# Patient Record
Sex: MALE | Employment: STUDENT | URBAN - METROPOLITAN AREA
[De-identification: names, ages, dates, MRNs, and addresses within clinical notes are randomized per-mention and may not be internally consistent; named-entity substitution may affect disease eponyms.]

---

## 2017-01-04 ENCOUNTER — OFFICE VISIT (OUTPATIENT)
Dept: PULMONOLOGY | Facility: CLINIC | Age: 27
End: 2017-01-04
Attending: ALLERGY & IMMUNOLOGY
Payer: COMMERCIAL

## 2017-01-04 VITALS
DIASTOLIC BLOOD PRESSURE: 63 MMHG | HEART RATE: 80 BPM | SYSTOLIC BLOOD PRESSURE: 109 MMHG | OXYGEN SATURATION: 97 % | RESPIRATION RATE: 16 BRPM

## 2017-01-04 DIAGNOSIS — R05.3 CHRONIC COUGH: ICD-10-CM

## 2017-01-04 DIAGNOSIS — J45.909 UNCOMPLICATED ASTHMA, UNSPECIFIED ASTHMA SEVERITY: ICD-10-CM

## 2017-01-04 DIAGNOSIS — J30.1 SEASONAL ALLERGIC RHINITIS DUE TO POLLEN: Primary | ICD-10-CM

## 2017-01-04 DIAGNOSIS — J30.89 ALLERGIC RHINITIS DUE TO AMERICAN HOUSE DUST MITE: ICD-10-CM

## 2017-01-04 DIAGNOSIS — R06.00 DYSPNEA, UNSPECIFIED TYPE: ICD-10-CM

## 2017-01-04 DIAGNOSIS — J31.0 CHRONIC RHINITIS: ICD-10-CM

## 2017-01-04 PROCEDURE — 99214 OFFICE O/P EST MOD 30 MIN: CPT | Mod: 25,ZF

## 2017-01-04 PROCEDURE — 95018 ALL TSTG PERQ&IQ DRUGS/BIOL: CPT | Mod: ZF

## 2017-01-04 PROCEDURE — 95004 PERQ TESTS W/ALRGNC XTRCS: CPT | Performed by: ALLERGY & IMMUNOLOGY

## 2017-01-04 ASSESSMENT — PAIN SCALES - GENERAL: PAINLEVEL: NO PAIN (0)

## 2017-01-04 NOTE — MR AVS SNAPSHOT
After Visit Summary   1/4/2017    Andrea Agarwal    MRN: [id number]           Patient Information     Date Of Birth          1990        Visit Information        Provider Department      1/4/2017 10:15 AM Jean Huerta MD [facility]        Today's Diagnoses     Seasonal allergic rhinitis due to pollen    -  1     Allergic rhinitis due to American house dust mite         Dyspnea, unspecified type           Care Instructions        Qvar with a baby bottle nipple adapter 1 spray in each nostril daily.  At the onset of colds remove nipple adapter and inhale 1 puff twice a day for up to 7 days (rinse mouth after use)    DUST MITE ALLERGY  Dust mites are microscopic bugs that live in dust, feeding on dead skin from our bodies. Dust mites flourish in warm, humid environments and therefore are highest  in number in carpeting, pillows and mattresses.     Tips:    Encase pillows, mattresses, and box springs in zippered allergy proof covers.    Wash all bed linens in at least 1300 F every week.    Remove stuffed animals or freeze them every other week.     Remove upholstered furniture from the bedroom and consider removing the carpet.     Keep ceiling fans off in the bedroom as they can stir up dust mite allergens.    Frequently dust and vacuum the house, especially the bedroom.    Acaracides (chemicals which kill mites) are available for use in the home. These acaracides require repeated applications to remain effective and may irritate asthmatics. It is still unclear how much, if any clinical benefit is gained by the use of acaracides. Therefore these agents are usually not recommended for initial mite control.        *All information has been reviewed, updated and approved by:  Dr. Jean Huerta-[place] [facility] at [facility] updated: 11/2016         Pollen Control Measures      * Keep windows and doors shut and run air conditioner at all times.  "This keeps outdoor air outside.    * Keep windows closed while in the car and air conditioner on the \"re-circulate\" mode.    * Wash your hair before going to bed at night to reduce exposure during sleep.    * Keep pets outdoors to prevent the pollen from being brought in on their hair.    * Avoid hanging clothes and linens outside as pollens may collect on the items.     * Don't mow the lawn if you are allergic to grass or weeds. If you must mow the grass, wear a face mask.       Spring: Tree Pollen    Summer: Grass Pollen and Mold    Fall: Weed Pollen and Mold      *All information has been reviewed, updated and approved by:  Dr. Jean Huerta-[place] for [facility] at [facility] updated: 11/2016        Follow-ups after your visit        Follow-up notes from your care team     Return in about 3 months (around 4/4/2017).      Your next 10 appointments already scheduled     Jan 05, 2017 10:15 AM   (Arrive by 10:00 AM)   Return Visit with Joselo Henderson MD   [facility] ([facility])    [address] [place] [id number] [phone]            Apr 10, 2017 10:15 AM   (Arrive by 10:00 AM)   Return Allergy with Jean Huerta MD   [facility] for [facility] ([facility])    [address] [place] [id number] [phone]           Do not take anti-histamines or Zantac for seven day prior to your appointment.              Future tests that were ordered for you today     Open Future Orders        Priority Expected Expires Ordered    General PFT Lab (Please always keep checked) Routine  1/4/2018 1/4/2017            Who to contact     If you have questions or need follow up information about today's clinic visit or your schedule please contact [facility] directly at [phone].  Normal or non-critical lab and imaging results " will be communicated to you by MyChart, letter or phone within 4 business days after the clinic has received the results. If you do not hear from us within 7 days, please contact the clinic through MyChart or phone. If you have a critical or abnormal lab result, we will notify you by phone as soon as possible.  Submit refill requests through [employer] or call your pharmacy and they will forward the refill request to us. Please allow 3 business days for your refill to be completed.          Additional Information About Your Visit        Care EveryWhere ID     This is your Care EveryWhere ID. This could be used by other organizations to access your [facility] medical records  [id number]        Your Vitals Were     Pulse Respirations Pulse Oximetry             80 16 97%          Blood Pressure from Last 3 Encounters:   01/04/17 109/63    Weight from Last 3 Encounters:   11/10/16 59.24 kg (130 lb 9.6 oz)              Today, you had the following     No orders found for display         Today's Medication Changes          These changes are accurate as of: 1/4/17 11:55 AM.  If you have any questions, ask your nurse or doctor.               Start taking these medicines.        Dose/Directions    beclomethasone 80 MCG/ACT Inhaler   Commonly known as:  QVAR   Used for:  Seasonal allergic rhinitis due to pollen, Allergic rhinitis due to American house dust mite, Dyspnea, unspecified type   Started by:  Jean Huerta MD        Dose:  2 puff   Inhale 2 puffs into the lungs 2 times daily   Quantity:  1 Inhaler   Refills:  2            Where to get your medicines      These medications were sent to [address] [address]     Phone:  [phone]    - beclomethasone 80 MCG/ACT Inhaler             Primary Care Provider    None Specified       No primary provider on file.        Thank you!     Thank you for choosing [facility]  for your care. Our goal is always to provide you with excellent care. Hearing back from our patients is one way we can continue to improve our services. Please take a few minutes to complete the written survey that you may receive in the mail after your visit with us. Thank you!             Your Updated Medication List - Protect others around you: Learn how to safely use, store and throw away your medicines at www.disposemymeds.org.          This list is accurate as of: 1/4/17 11:55 AM.  Always use your most recent med list.                   Brand Name Dispense Instructions for use    beclomethasone 80 MCG/ACT Inhaler    QVAR    1 Inhaler    Inhale 2 puffs into the lungs 2 times daily

## 2017-01-04 NOTE — PATIENT INSTRUCTIONS
"    Qvar with a baby bottle nipple adapter 1 spray in each nostril daily.  At the onset of colds remove nipple adapter and inhale 1 puff twice a day for up to 7 days (rinse mouth after use)    DUST MITE ALLERGY  Dust mites are microscopic bugs that live in dust, feeding on dead skin from our bodies. Dust mites flourish in warm, humid environments and therefore are highest  in number in carpeting, pillows and mattresses.     Tips:    Encase pillows, mattresses, and box springs in zippered allergy proof covers.    Wash all bed linens in at least 1300 F every week.    Remove stuffed animals or freeze them every other week.     Remove upholstered furniture from the bedroom and consider removing the carpet.     Keep ceiling fans off in the bedroom as they can stir up dust mite allergens.    Frequently dust and vacuum the house, especially the bedroom.    Acaracides (chemicals which kill mites) are available for use in the home. These acaracides require repeated applications to remain effective and may irritate asthmatics. It is still unclear how much, if any clinical benefit is gained by the use of acaracides. Therefore these agents are usually not recommended for initial mite control.        *All information has been reviewed, updated and approved by:  Dr. [facility] at [facility] updated: 11/2016         Pollen Control Measures      * Keep windows and doors shut and run air conditioner at all times. This keeps outdoor air outside.    * Keep windows closed while in the car and air conditioner on the \"re-circulate\" mode.    * Wash your hair before going to bed at night to reduce exposure during sleep.    * Keep pets outdoors to prevent the pollen from being brought in on their hair.    * Avoid hanging clothes and linens outside as pollens may collect on the items.     * Don't mow the lawn if you are allergic to grass or weeds. If you must mow the grass, wear a face mask.       Spring: " Tree Pollen    Summer: Grass Pollen and Mold    Fall: Weed Pollen and Mold      *All information has been reviewed, updated and approved by:  Dr. Jean Huerta[facility] for [facility] at [facility] updated: 11/2016

## 2017-01-04 NOTE — Clinical Note
1/4/2017       RE: Andrea Agarwal  [address]  [place] [address]     Dear Colleague,    Thank you for referring your patient, Andrea Agarwal, to the [facility] at [facility]. Please see a copy of my visit note below.    Reason for Visit  Andrea Agarwal is a 26 year old male who is referred by ENT for asthma    Allergy HPI  SUBJECTIVE:  Andrea presents today for initial consultation regarding concern of throat clearing and a sense of mucus in the back of his throat.  It is worse in the mornings.  He states he has tried allergy tablets and Flonase for a few weeks without much benefit.  He has had this issue for most of his life.  He did grow up in [place] and had similar problems that worsened with colds but then 1-1/2 years ago in the  he noticed it became more chronic.  After a cold he also gets a cough.  He thinks he was given an inhaler when he was aged 7 or 8 but has not used one in a long time.  He was recently on montelukast for a month without any benefit.  He has had no breathing tests before.  ENT did accurately order spirometry before seeing me but he has not had this done yet.        FAMILY HISTORY:  Father with allergies.      SOCIAL HISTORY:  No pets, no smokers.  He is a student at the [employer].         The patient was seen and examined by Jean Saldivar MD   No current outpatient prescriptions on file.     No current facility-administered medications for this visit.     Allergies   Allergen Reactions     No Clinical Screening - See Comments      Patient suspects he has allergies     Social History     Social History     Marital Status: Single     Spouse Name: N/A     Number of Children: N/A     Years of Education: N/A     Occupational History     Not on file.     Social History Main Topics     Smoking status: Never Smoker      Smokeless tobacco: Never Used     Alcohol Use: No     Drug Use: Not on file      Sexual Activity: Not on file     Other Topics Concern     Not on file     Social History Narrative     Past Medical History   Diagnosis Date     Recurrent otitis media      No past surgical history on file.  No family history on file.      ROS   A complete ROS was otherwise negative except as noted in the HPI and the end of the note.  /63 mmHg  Pulse 80  Resp 16  SpO2 97%  Exam:   GENERAL APPEARANCE: Well developed, well nourished, alert, and in no apparent distress.  EYES: PERRL, EOMI, conjunctiva clear non-injected  HENT: Nasal mucosa with no edema and no discharge. No nasal polyps.    EARS: Canals clear, TMs normal  MOUTH: Oral mucosa is moist, without any lesions, no tonsillar enlargement, no oropharyngeal exudate.  RESP: Good air flow throughout.  No crackles. No rhonchi. No wheezes.  CV: Normal S1, S2, regular rhythm, normal rate. No murmur.  No rub. No gallop. No LE edema.   MS: Extremities normal. No clubbing. No cyanosis.  SKIN: No rashes noted  NEURO: Speech normal, normal strength and tone, normal gait and stance  PSYCH: Normal mentation, orientation to person, place, and time.  Results:  39 percutaneous environmental allergen extracts placed by MA and read by MD.  Positive to Dust mites, tree, grass and weed pollens.      Assessment and plan: Patient here regarding concern of possible asthma and allergies.  He has a sensation of postnasal drip or something in his throat and then gets some shortness of breath after colds.  IgE mediated skin testing today was positive to dust mites, tree, grass and weed pollens.  I do recommend him using QVAR  into the nose 1 spray each nostril once or twice a day.  At the onset of colds, he will remove that adapter and use it into the lungs twice a day, 2 puffs, and rinse mouth after use.  We did spirometry today which is completely normal.  There is no reversibility to albuterol meaning baseline is looking very good, but this might change when he does get viral  infections.  I would like to see him back in 3 months or sooner if necessary.         Again, thank you for allowing me to participate in the care of your patient.      Sincerely,    Jean Saldivar MD

## 2017-01-04 NOTE — NURSING NOTE
Chief Complaint   Patient presents with     Allergy Consult     Patient is being seen for allergy consultation      Alina Pugh CMA at 10:03 AM on 1/4/2017

## 2017-01-04 NOTE — PROGRESS NOTES
Reason for Visit  Andrea Agarwal is a 26 year old male who is referred by ENT for asthma    Allergy HPI  SUBJECTIVE:  Andrea presents today for initial consultation regarding concern of throat clearing and a sense of mucus in the back of his throat.  It is worse in the mornings.  He states he has tried allergy tablets and Flonase for a few weeks without much benefit.  He has had this issue for most of his life.  He did grow up in [place] and had similar problems that worsened with colds but then 1-[place] years ago in the [place] he noticed it became more chronic.  After a cold he also gets a cough.  He thinks he was given an inhaler when he was aged 7 or 8 but has not used one in a long time.  He was recently on montelukast for a month without any benefit.  He has had no breathing tests before.  ENT did accurately order spirometry before seeing me but he has not had this done yet.        FAMILY HISTORY:  Father with allergies.      SOCIAL HISTORY:  No pets, no smokers.  He is a student at the [employer].         The patient was seen and examined by Jean Saldivar MD   No current outpatient prescriptions on file.     No current facility-administered medications for this visit.     Allergies   Allergen Reactions     No Clinical Screening - See Comments      Patient suspects he has allergies     Social History     Social History     Marital Status: Single     Spouse Name: N/A     Number of Children: N/A     Years of Education: N/A     Occupational History     Not on file.     Social History Main Topics     Smoking status: Never Smoker      Smokeless tobacco: Never Used     Alcohol Use: No     Drug Use: Not on file     Sexual Activity: Not on file     Other Topics Concern     Not on file     Social History Narrative     Past Medical History   Diagnosis Date     Recurrent otitis media      No past surgical history on file.  No family history on file.      ROS   A complete ROS was otherwise negative except as noted in the  HPI and the end of the note.  /63 mmHg  Pulse 80  Resp 16  SpO2 97%  Exam:   GENERAL APPEARANCE: Well developed, well nourished, alert, and in no apparent distress.  EYES: PERRL, EOMI, conjunctiva clear non-injected  HENT: Nasal mucosa with no edema and no discharge. No nasal polyps.    EARS: Canals clear, TMs normal  MOUTH: Oral mucosa is moist, without any lesions, no tonsillar enlargement, no oropharyngeal exudate.  RESP: Good air flow throughout.  No crackles. No rhonchi. No wheezes.  CV: Normal S1, S2, regular rhythm, normal rate. No murmur.  No rub. No gallop. No LE edema.   MS: Extremities normal. No clubbing. No cyanosis.  SKIN: No rashes noted  NEURO: Speech normal, normal strength and tone, normal gait and stance  PSYCH: Normal mentation, orientation to person, place, and time.  Results:  39 percutaneous environmental allergen extracts placed by MA and read by MD.  Positive to Dust mites, tree, grass and weed pollens.      Assessment and plan: Patient here regarding concern of possible asthma and allergies.  He has a sensation of postnasal drip or something in his throat and then gets some shortness of breath after colds.  IgE mediated skin testing today was positive to dust mites, tree, grass and weed pollens.  I do recommend him using QVAR  into the nose 1 spray each nostril once or twice a day.  At the onset of colds, he will remove that adapter and use it into the lungs twice a day, 2 puffs, and rinse mouth after use.  We did spirometry today which is completely normal.  There is no reversibility to albuterol meaning baseline is looking very good, but this might change when he does get viral infections.  I would like to see him back in 3 months or sooner if necessary.

## 2017-01-05 ENCOUNTER — OFFICE VISIT (OUTPATIENT)
Dept: OTOLARYNGOLOGY | Facility: CLINIC | Age: 27
End: 2017-01-05

## 2017-01-05 VITALS — WEIGHT: 130.5 LBS | HEIGHT: 67 IN | BODY MASS INDEX: 20.48 KG/M2

## 2017-01-05 DIAGNOSIS — J31.0 CHRONIC RHINITIS: ICD-10-CM

## 2017-01-05 DIAGNOSIS — J45.909 UNCOMPLICATED ASTHMA, UNSPECIFIED ASTHMA SEVERITY: Primary | ICD-10-CM

## 2017-01-05 ASSESSMENT — PAIN SCALES - GENERAL: PAINLEVEL: NO PAIN (0)

## 2017-01-05 NOTE — PATIENT INSTRUCTIONS
The patient presents with a history of chronic post-nasal drainage, coughing and rhinitis. He has been evaluated and treated by Dr. Jean Huerta in the Allergy Department and he is making progress. The patient will be seen again as needed.

## 2017-01-05 NOTE — PROGRESS NOTES
The patient presents with a history of chronic coughing and post-nasal drainage. The coughing is particularly bad after a respiratory infection and can last for months. He has a long history allergies and he wonders if he might have asthma. The patient recently visited with Dr. Jean Huerta and he was confirmed to has allergies and asthma. He was initiated on therapy for these conditions. He reports that he feels much better using his inhaler therapy.         All other systems were reviewed and they are either negative or they are not directly pertinent to this Otolaryngology examination.      Past Medical History:    Past Medical History   Diagnosis Date     Recurrent otitis media        Past Surgical History:    No past surgical history on file.    Medications:      Current outpatient prescriptions:      beclomethasone (QVAR) 80 MCG/ACT Inhaler, Inhale 2 puffs into the lungs 2 times daily, Disp: 1 Inhaler, Rfl: 2    Allergies:    No clinical screening - see comments    Physical Examination:    The patient is a well developed, well nourished male in no apparent distress.  He is normocepahlic, atraumatic with pupils equally round and reactive to light.    Oral Cavity Examination: Normal Mucosa with no masses or lesions  Nasal Examination: Congested nasal turbinates and nasal mucosa with no masses or lesions  Ear Examination: Ear canals clear, tympanic membranes and middle ear spaces normal  Neurological Examination: Facial nerve function intact and symmetric  Integumentary Examination: No lesions on the skin of the head or neck    Assessment and Plan:    The patient presents with a history of chronic post-nasal drainage, coughing and rhinitis. He has been evaluated and treated by Dr. Jean Huerta in the Allergy Department and he is making progress. The patient will be seen again as needed.

## 2017-01-05 NOTE — NURSING NOTE
Chief Complaint   Patient presents with     RECHECK     follow up after allergist      Uche Flores LPN

## 2017-01-05 NOTE — MR AVS SNAPSHOT
After Visit Summary   1/5/2017    Andrea Agarwal    MRN: [id number]           Patient Information     Date Of Birth          1990        Visit Information        Provider Department      1/5/2017 11:00 AM Joselo Henderson MD [facility]        Today's Diagnoses     Uncomplicated asthma, unspecified asthma severity    -  1     Chronic rhinitis           Care Instructions    The patient presents with a history of chronic post-nasal drainage, coughing and rhinitis. He has been evaluated and treated by Dr. Jean Huerta in the Allergy Department and he is making progress. The patient will be seen again as needed.          Follow-ups after your visit        Your next 10 appointments already scheduled     Apr 10, 2017 10:15 AM   (Arrive by 10:00 AM)   Return Allergy with Jean Huerta MD   [facility] for [facility] and [facility] ([facility])    [address] [place] [id number] [phone]           Do not take anti-histamines or Zantac for seven day prior to your appointment.              Future tests that were ordered for you today     Open Future Orders        Priority Expected Expires Ordered    General PFT Lab (Please always keep checked) Routine  1/4/2018 1/4/2017            Who to contact     Please call your clinic at [phone] to:    Ask questions about your health    Make or cancel appointments    Discuss your medicines    Learn about your test results    Speak to your doctor   If you have compliments or concerns about an experience at your clinic, or if you wish to file a complaint, please contact [facility] Patient Relations at [phone] or email us at [email or web address]         Additional Information About Your Visit        Care EveryWhere ID     This is your Care EveryWhere ID. This could be used by other organizations to access your [facility]  "records  [id number]        Your Vitals Were     Height BMI (Body Mass Index)                1.71 m (5' 7.32\") 20.24 kg/m2           Blood Pressure from Last 3 Encounters:   01/04/17 109/63    Weight from Last 3 Encounters:   01/05/17 59.194 kg (130 lb 8 oz)   11/10/16 59.24 kg (130 lb 9.6 oz)              Today, you had the following     No orders found for display       Primary Care Provider    None Specified       No primary provider on file.        Thank you!     Thank you for choosing [facility] E[facility]SE [facility] TH[facility]AT  for your care. Our goal is always to provide you with excellent care. Hearing back from our patients is one way we can continue to improve our services. Please take a few minutes to complete the written survey that you may receive in the mail after your visit with us. Thank you!             Your Updated Medication List - Protect others around you: Learn how to safely use, store and throw away your medicines at www.disposemymeds.org.          This list is accurate as of: 1/5/17 11:34 AM.  Always use your most recent med list.                   Brand Name Dispense Instructions for use    beclomethasone 80 MCG/ACT Inhaler    QVAR    1 Inhaler    Inhale 2 puffs into the lungs 2 times daily         "

## 2017-01-05 NOTE — Clinical Note
1/5/2017       RE: Andrea Agarwal  [address]  [address]  [facility] [address]     Dear Colleague,    Thank you for referring your patient, Andrea Agarwal, to the [facility] EAR NOSE AND THROAT at [facility]. Please see a copy of my visit note below.    The patient presents with a history of chronic coughing and post-nasal drainage. The coughing is particularly bad after a respiratory infection and can last for months. He has a long history allergies and he wonders if he might have asthma. The patient recently visited with Dr. Jean Huerta and he was confirmed to has allergies and asthma. He was initiated on therapy for these conditions. He reports that he feels much better using his inhaler therapy.         All other systems were reviewed and they are either negative or they are not directly pertinent to this Otolaryngology examination.      Past Medical History:    Past Medical History   Diagnosis Date     Recurrent otitis media        Past Surgical History:    No past surgical history on file.    Medications:      Current outpatient prescriptions:      beclomethasone (QVAR) 80 MCG/ACT Inhaler, Inhale 2 puffs into the lungs 2 times daily, Disp: 1 Inhaler, Rfl: 2    Allergies:    No clinical screening - see comments    Physical Examination:    The patient is a well developed, well nourished male in no apparent distress.  He is normocepahlic, atraumatic with pupils equally round and reactive to light.    Oral Cavity Examination: Normal Mucosa with no masses or lesions  Nasal Examination: Congested nasal turbinates and nasal mucosa with no masses or lesions  Ear Examination: Ear canals clear, tympanic membranes and middle ear spaces normal  Neurological Examination: Facial nerve function intact and symmetric  Integumentary Examination: No lesions on the skin of the head or neck    Assessment and Plan:    The patient presents with a history of chronic post-nasal drainage,  coughing and rhinitis. He has been evaluated and treated by Dr. Jean Huerta in the Allergy Department and he is making progress. The patient will be seen again as needed.          Again, thank you for allowing me to participate in the care of your patient.      Sincerely,    Joselo Henderson MD

## 2017-01-12 LAB
EXPTIME-PRE: 5.21 SEC
FEF2575-%PRED-POST: 82 %
FEF2575-%PRED-PRE: 72 %
FEF2575-POST: 3.49 L/SEC
FEF2575-PRE: 3.07 L/SEC
FEF2575-PRED: 4.25 L/SEC
FEFMAX-%PRED-PRE: 96 %
FEFMAX-PRE: 9.25 L/SEC
FEFMAX-PRED: 9.54 L/SEC
FEV1-%PRED-PRE: 97 %
FEV1-PRE: 3.82 L
FEV1FEV6-PRE: 76 %
FEV1FEV6-PRED: 84 %
FEV1FVC-PRE: 76 %
FEV1FVC-PRED: 86 %
FIFMAX-PRE: 8.65 L/SEC
FVC-%PRED-PRE: 109 %
FVC-PRE: 5.03 L
FVC-PRED: 4.58 L

## 2017-05-15 ENCOUNTER — OFFICE VISIT (OUTPATIENT)
Dept: PULMONOLOGY | Facility: CLINIC | Age: 27
End: 2017-05-15
Attending: ALLERGY & IMMUNOLOGY
Payer: COMMERCIAL

## 2017-05-15 VITALS
OXYGEN SATURATION: 97 % | SYSTOLIC BLOOD PRESSURE: 103 MMHG | DIASTOLIC BLOOD PRESSURE: 58 MMHG | RESPIRATION RATE: 16 BRPM | HEART RATE: 83 BPM

## 2017-05-15 DIAGNOSIS — J30.89 ALLERGIC RHINITIS DUE TO AMERICAN HOUSE DUST MITE: Primary | ICD-10-CM

## 2017-05-15 DIAGNOSIS — J30.1 SEASONAL ALLERGIC RHINITIS DUE TO POLLEN: ICD-10-CM

## 2017-05-15 PROCEDURE — 99212 OFFICE O/P EST SF 10 MIN: CPT | Mod: ZF

## 2017-05-15 ASSESSMENT — PAIN SCALES - GENERAL: PAINLEVEL: NO PAIN (0)

## 2017-05-15 NOTE — MR AVS SNAPSHOT
After Visit Summary   5/15/2017    Andrea Agarwal    MRN: [id number]           Patient Information     Date Of Birth          1990        Visit Information        Provider Department      5/15/2017 12:55 PM Jean Huerta MD [facility]        Care Instructions    Go back to using the nasal spray can do 1 spray twice a day.  If after 2 weeks still with problems  ADD 10 mg cetirizine (Zyrtec).    Can try to decrease dairy.    If still with symptoms we will want to consider allergy shots.      IMMUNOTHERAPY (ALLERGY SHOTS)  General Information    What are allergy shots and what can they do for me?  --Allergy shots (otherwise known as immunotherapy or desensitization) help make your immune system less sensitive to the things that cause your allergy symptoms.    Should I have allergy shots?  --MAYBE--If you are allergic to things that are unavoidable such as trees, grasses, weeds, molds, dust or animals.  --MAYBE--If you have to take medicine(s) more often than not to control your allergies; OR despite your medicines, you are still having allergy symptoms.  --MAYBE--If your allergies are interfering with daily activities.    What can allergy shots do for me?  --Eventually, you may no longer need your allergy medicine. You should have fewer or milder allergy symptoms. You may no longer have allergy symptoms. You may not have to visit your health care provider as often.     Can allergy shots really help?  --The success rate for allergy shots is high ~75-85%. Many people consider themselves  cured , but some will suffer a recurrence. Some people will require shots for longer than 5 years, but most people who complete the allergy shot program do not need to take shots again. Typically immunotherapy is complete around 3 years, but it depends on how well the doseages are tolerated, but the doctor will decide.    How do allergy shots work?  --Allergy shots result in  your body making  blocking IgG  antibodies to your  allergy IgE  antibodies. This makes you react less to the things that cause your allergy symptoms. If your allergies are  blocked , you do not release histamines or other allergy mediators that cause your allergy symptoms.     How long before allergy shots start to work?  --Allergy shots may start to work in four to six months. For some people it may take a year. If there is no improvement after two years, discuss this with your doctor or nurse practitioner. The typical maximum benefit from allergy shots usually occurs within the first 1-2 years after reaching and adequate maintenance dose.     Can you still take your other medications?  --The goal of the allergy shots is to allow you to feel as good as possible, with as little medication as possible, and with as few shots as possible. You may still need to take your medications until the allergy shots begin to work.     How much do allergy shots cost?  --Allergy shots may be somewhat costly the first year when more frequent allergy injections are needed. The vial of allergy extract is a separate cost from the actual injection. Your insurance is billed when the allergy vial of extract is made and the injection cost is billed when the shot is administered. Coverage of the cost by insurance plans and HMO varies. In succeeding years, when the injections are spread out to 2-8 weeks, the cost dramatically decreases. Allergy shots may save you money, as you will not require as much medicine, and as you will decrease the number of visits to your health care provider.    Are allergy shots painful?  --The needle used to give shots is very small and thin. The shot is given in the fatty part of the arm, not into the muscle. Most people say that when the allergy shot is given that it does not hurt.     Are allergy shots risky?  --Allergic reactions are rare. Reactions can occur because you are receiving small amounts of what  you are allergic to. The most serious reactions occur within 30 minutes of the injection.       LOCAL REACTIONS:  Some swelling and/or redness may occur in the first twenty minutes. If there is no discomfort, this may be ignored. If there is a local reaction (the size of a twenty-five cent piece), or a hive which lasts longer than 24 hours, please notifiy us before receiving the next injection. Occasionally, you may develop a delayed local reaction between 4-48 hours. These reactions require no treatment, but may be counteracted by using a cool compress and by taking a dose of an antihistamine, plus Tylenol (Acetaminophen) for pain.  SYSTEMIC REACTIONS: This type of reaction is more serious. It can consist of symptoms of throat tightening, difficulty breathing, fainting, vomiting, or hives, amongst others. If you are still in the office/clinic, immediately notify a health care provider. If you have left the office/clinic, seek immediate health care assistance. Systemic reactions require immediate treatment and notification of our office. Treatment of systemic reactions requires the administration of adrenaline (epinephrine) and evaluation by a physician. Please call our office at [phone] ext. [phone] to speak to a nurse during business hours. If after hours please go to the ER.      **EVERYONE MUST WAIT 30-MINUTES AFTER EACH ALLERGY SHOT IN CASE OF A REACTION!!**                        *All information has been reviewed, updated and approved by:  Dr. Jean Huerta-[facility] updated: 1/2017          Follow-ups after your visit        Follow-up notes from your care team     Return if symptoms worsen or fail to improve.      Who to contact     If you have questions or need follow up information about today's clinic visit or your schedule please contact [facility] directly at [phone].  Normal or non-critical lab and imaging results will be communicated to  "you by MyChart, letter or phone within 4 business days after the clinic has received the results. If you do not hear from us within 7 days, please contact the clinic through [employer] or phone. If you have a critical or abnormal lab result, we will notify you by phone as soon as possible.  Submit refill requests through [employer] or call your pharmacy and they will forward the refill request to us. Please allow 3 business days for your refill to be completed.          Additional Information About Your Visit        [employer] Information     i[employer]ulfill[employer] lets you send messages to your doctor, view your test results, renew your prescriptions, schedule appointments and more. To sign up, go to [email or web address] . Click on \"Log in\" on the left side of the screen, which will take you to the Welcome page. Then click on \"Sign up Now\" on the right side of the page.     You will be asked to enter the access code listed below, as well as some personal information. Please follow the directions to create your username and password.     Your access code is: [email or web address]  Expires: 2017  6:30 AM     Your access code will  in 90 days. If you need help or a new code, please call your [facility] clinic or [phone].        Care EveryWhere ID     This is your Care EveryWhere ID. This could be used by other organizations to access your [facility] medical records  [id number]        Your Vitals Were     Pulse Respirations Pulse Oximetry             83 16 97%          Blood Pressure from Last 3 Encounters:   05/15/17 103/58   17 109/63    Weight from Last 3 Encounters:   17 59.2 kg (130 lb 8 oz)   11/10/16 59.2 kg (130 lb 9.6 oz)              Today, you had the following     No orders found for display       Primary Care Provider    None Specified       No primary provider on file.        Thank you!     Thank you for choosing [facility]  for your care. Our goal is always to provide you with " excellent care. Hearing back from our patients is one way we can continue to improve our services. Please take a few minutes to complete the written survey that you may receive in the mail after your visit with us. Thank you!             Your Updated Medication List - Protect others around you: Learn how to safely use, store and throw away your medicines at www.disposemymeds.org.      Notice  As of 5/15/2017  1:19 PM    You have not been prescribed any medications.

## 2017-05-15 NOTE — LETTER
5/15/2017       RE: Andrea Agarwal  [address]  [address]  [facility] [address]     Dear Colleague,    Thank you for referring your patient, Andrea Agarwal, to the [facility] at [facility]. Please see a copy of my visit note below.    Reason for Visit  Andrea Agarwal is a 26 year old male who is here for follow-up for rhintiis.    Allergy HPI  Andrea is doing better in regards to his nasal congestion and postnasal drip.  He says overall he is much better, but he still has a little bit of mucus in the back of his throat.  He denies any other symptoms.  He used a nasal spray with some good benefit and he stopped it.  We are using QVAR in the nose 1 spray in each nostril daily.  He thinks he has had lasting benefit from it but still has some problems.  We talked about different foods and triggers.  He eats dairy products a few times a day.  There have been no other changes in his health.       The patient was seen and examined by Jean Saldivar MD   No current outpatient prescriptions on file.     No current facility-administered medications for this visit.      Allergies   Allergen Reactions     No Clinical Screening - See Comments      Patient suspects he has allergies     Social History     Social History     Marital status: Single     Spouse name: N/A     Number of children: N/A     Years of education: N/A     Occupational History     Not on file.     Social History Main Topics     Smoking status: Never Smoker     Smokeless tobacco: Never Used     Alcohol use No     Drug use: Not on file     Sexual activity: Not on file     Other Topics Concern     Not on file     Social History Narrative     Past Medical History:   Diagnosis Date     Recurrent otitis media      No past surgical history on file.  No family history on file.      ROS   A complete ROS was otherwise negative except as noted in the HPI.  /58 (BP Location: Right arm,  Patient Position: Chair, Cuff Size: Adult Regular)  Pulse 83  Resp 16  SpO2 97%  Exam:   GENERAL APPEARANCE: Well developed, well nourished, alert, and in no apparent distress.  EYES: EOMI, conjunctiva clear non-injected  HENT: Nasal mucosa with pale swollen edema and no discharge.    EARS: Canals clear, TMs normal.  MOUTH: Whitish frothy substance back of throat. Oral mucosa is moist, without any lesions, no tonsillar enlargement.  RESP: Good air flow throughout.  No crackles. No rhonchi. No wheezes.  CV: Normal S1, S2, regular rhythm, normal rate. No murmur.  No rub. No gallop. No LE edema.   MS: Extremities normal. No clubbing. No cyanosis.  SKIN: No rashes noted.  NEURO: Speech normal, normal strength and tone, normal gait and stance  PSYCH: Normal mentation, orientation to person, place, and time.  Results:      Assessment and plan: Andrea has allergic rhinitis.    Go back to using the nasal spray can do 1 spray twice a day.  If after 2 weeks still with problems  ADD 10 mg cetirizine (Zyrtec).    Can try to decrease dairy.    If still with symptoms we will want to consider allergy shots.    He did state at the end that he doesn't really think his symptoms are bad enough to warrant meds so he can use the allergen control measures and follow up as needed.    Again, thank you for allowing me to participate in the care of your patient.      Sincerely,    Jean Saldivar MD

## 2017-05-15 NOTE — PATIENT INSTRUCTIONS
Go back to using the nasal spray can do 1 spray twice a day.  If after 2 weeks still with problems  ADD 10 mg cetirizine (Zyrtec).    Can try to decrease dairy.    If still with symptoms we will want to consider allergy shots.      IMMUNOTHERAPY (ALLERGY SHOTS)  General Information    What are allergy shots and what can they do for me?  --Allergy shots (otherwise known as immunotherapy or desensitization) help make your immune system less sensitive to the things that cause your allergy symptoms.    Should I have allergy shots?  --MAYBE--If you are allergic to things that are unavoidable such as trees, grasses, weeds, molds, dust or animals.  --MAYBE--If you have to take medicine(s) more often than not to control your allergies; OR despite your medicines, you are still having allergy symptoms.  --MAYBE--If your allergies are interfering with daily activities.    What can allergy shots do for me?  --Eventually, you may no longer need your allergy medicine. You should have fewer or milder allergy symptoms. You may no longer have allergy symptoms. You may not have to visit your health care provider as often.     Can allergy shots really help?  --The success rate for allergy shots is high ~75-85%. Many people consider themselves  cured , but some will suffer a recurrence. Some people will require shots for longer than 5 years, but most people who complete the allergy shot program do not need to take shots again. Typically immunotherapy is complete around 3 years, but it depends on how well the doseages are tolerated, but the doctor will decide.    How do allergy shots work?  --Allergy shots result in your body making  blocking IgG  antibodies to your  allergy IgE  antibodies. This makes you react less to the things that cause your allergy symptoms. If your allergies are  blocked , you do not release histamines or other allergy mediators that cause your allergy symptoms.     How long before allergy shots start to  work?  --Allergy shots may start to work in four to six months. For some people it may take a year. If there is no improvement after two years, discuss this with your doctor or nurse practitioner. The typical maximum benefit from allergy shots usually occurs within the first 1-2 years after reaching and adequate maintenance dose.     Can you still take your other medications?  --The goal of the allergy shots is to allow you to feel as good as possible, with as little medication as possible, and with as few shots as possible. You may still need to take your medications until the allergy shots begin to work.     How much do allergy shots cost?  --Allergy shots may be somewhat costly the first year when more frequent allergy injections are needed. The vial of allergy extract is a separate cost from the actual injection. Your insurance is billed when the allergy vial of extract is made and the injection cost is billed when the shot is administered. Coverage of the cost by insurance plans and HMO varies. In succeeding years, when the injections are spread out to 2-8 weeks, the cost dramatically decreases. Allergy shots may save you money, as you will not require as much medicine, and as you will decrease the number of visits to your health care provider.    Are allergy shots painful?  --The needle used to give shots is very small and thin. The shot is given in the fatty part of the arm, not into the muscle. Most people say that when the allergy shot is given that it does not hurt.     Are allergy shots risky?  --Allergic reactions are rare. Reactions can occur because you are receiving small amounts of what you are allergic to. The most serious reactions occur within 30 minutes of the injection.       LOCAL REACTIONS:  Some swelling and/or redness may occur in the first twenty minutes. If there is no discomfort, this may be ignored. If there is a local reaction (the size of a twenty-five cent piece), or a hive which  lasts longer than 24 hours, please notifiy us before receiving the next injection. Occasionally, you may develop a delayed local reaction between 4-48 hours. These reactions require no treatment, but may be counteracted by using a cool compress and by taking a dose of an antihistamine, plus Tylenol (Acetaminophen) for pain.  SYSTEMIC REACTIONS: This type of reaction is more serious. It can consist of symptoms of throat tightening, difficulty breathing, fainting, vomiting, or hives, amongst others. If you are still in the office/clinic, immediately notify a health care provider. If you have left the office/clinic, seek immediate health care assistance. Systemic reactions require immediate treatment and notification of our office. Treatment of systemic reactions requires the administration of adrenaline (epinephrine) and evaluation by a physician. Please call our office at [phone] ext. [phone] to speak to a nurse during business hours. If after hours please go to the ER.      **EVERYONE MUST WAIT 30-MINUTES AFTER EACH ALLERGY SHOT IN CASE OF A REACTION!!**                        *All information has been reviewed, updated and approved by:  Dr. Jean Huerta-[facility] updated: 1/2017

## 2017-05-15 NOTE — PROGRESS NOTES
Reason for Visit  Andrea Agarwal is a 26 year old male who is here for follow-up for rhintiis.    Allergy HPI  Andrea is doing better in regards to his nasal congestion and postnasal drip.  He says overall he is much better, but he still has a little bit of mucus in the back of his throat.  He denies any other symptoms.  He used a nasal spray with some good benefit and he stopped it.  We are using QVAR in the nose 1 spray in each nostril daily.  He thinks he has had lasting benefit from it but still has some problems.  We talked about different foods and triggers.  He eats dairy products a few times a day.  There have been no other changes in his health.       The patient was seen and examined by Jean Saldivar MD   No current outpatient prescriptions on file.     No current facility-administered medications for this visit.      Allergies   Allergen Reactions     No Clinical Screening - See Comments      Patient suspects he has allergies     Social History     Social History     Marital status: Single     Spouse name: N/A     Number of children: N/A     Years of education: N/A     Occupational History     Not on file.     Social History Main Topics     Smoking status: Never Smoker     Smokeless tobacco: Never Used     Alcohol use No     Drug use: Not on file     Sexual activity: Not on file     Other Topics Concern     Not on file     Social History Narrative     Past Medical History:   Diagnosis Date     Recurrent otitis media      No past surgical history on file.  No family history on file.      ROS   A complete ROS was otherwise negative except as noted in the HPI.  /58 (BP Location: Right arm, Patient Position: Chair, Cuff Size: Adult Regular)  Pulse 83  Resp 16  SpO2 97%  Exam:   GENERAL APPEARANCE: Well developed, well nourished, alert, and in no apparent distress.  EYES: EOMI, conjunctiva clear non-injected  HENT: Nasal mucosa with pale swollen edema and no discharge.    EARS: Canals clear,  TMs normal.  MOUTH: Whitish frothy substance back of throat. Oral mucosa is moist, without any lesions, no tonsillar enlargement.  RESP: Good air flow throughout.  No crackles. No rhonchi. No wheezes.  CV: Normal S1, S2, regular rhythm, normal rate. No murmur.  No rub. No gallop. No LE edema.   MS: Extremities normal. No clubbing. No cyanosis.  SKIN: No rashes noted.  NEURO: Speech normal, normal strength and tone, normal gait and stance  PSYCH: Normal mentation, orientation to person, place, and time.  Results:      Assessment and plan: Andrea has allergic rhinitis.    Go back to using the nasal spray can do 1 spray twice a day.  If after 2 weeks still with problems  ADD 10 mg cetirizine (Zyrtec).    Can try to decrease dairy.    If still with symptoms we will want to consider allergy shots.    He did state at the end that he doesn't really think his symptoms are bad enough to warrant meds so he can use the allergen control measures and follow up as needed.

## 2019-06-11 ENCOUNTER — HOSPITAL ENCOUNTER (EMERGENCY)
Facility: CLINIC | Age: 29
Discharge: HOME | End: 2019-06-11
Attending: EMERGENCY MEDICINE | Admitting: EMERGENCY MEDICINE
Payer: COMMERCIAL

## 2019-06-11 VITALS
OXYGEN SATURATION: 99 % | HEART RATE: 99 BPM | DIASTOLIC BLOOD PRESSURE: 62 MMHG | HEIGHT: 67 IN | RESPIRATION RATE: 12 BRPM | TEMPERATURE: 99.3 F | BODY MASS INDEX: 20.48 KG/M2 | SYSTOLIC BLOOD PRESSURE: 109 MMHG

## 2019-06-11 DIAGNOSIS — R11.10 VOMITING AND DIARRHEA: ICD-10-CM

## 2019-06-11 DIAGNOSIS — E87.6 HYPOKALEMIA: ICD-10-CM

## 2019-06-11 DIAGNOSIS — R19.7 VOMITING AND DIARRHEA: ICD-10-CM

## 2019-06-11 DIAGNOSIS — R55 SYNCOPE, UNSPECIFIED SYNCOPE TYPE: ICD-10-CM

## 2019-06-11 LAB
ALBUMIN SERPL-MCNC: 4.5 G/DL (ref 3.4–5)
ALP SERPL-CCNC: 52 U/L (ref 40–150)
ALT SERPL W P-5'-P-CCNC: 31 U/L (ref 0–70)
ANION GAP SERPL CALCULATED.3IONS-SCNC: 8 MMOL/L (ref 3–14)
AST SERPL W P-5'-P-CCNC: 11 U/L (ref 0–45)
BASOPHILS # BLD AUTO: 0 10E9/L (ref 0–0.2)
BASOPHILS NFR BLD AUTO: 0.2 %
BILIRUB SERPL-MCNC: 0.9 MG/DL (ref 0.2–1.3)
BUN SERPL-MCNC: 9 MG/DL (ref 7–30)
CALCIUM SERPL-MCNC: 9 MG/DL (ref 8.5–10.1)
CHLORIDE SERPL-SCNC: 102 MMOL/L (ref 94–109)
CO2 SERPL-SCNC: 26 MMOL/L (ref 20–32)
CREAT SERPL-MCNC: 0.96 MG/DL (ref 0.66–1.25)
DIFFERENTIAL METHOD BLD: ABNORMAL
EOSINOPHIL # BLD AUTO: 0 10E9/L (ref 0–0.7)
EOSINOPHIL NFR BLD AUTO: 0.1 %
ERYTHROCYTE [DISTWIDTH] IN BLOOD BY AUTOMATED COUNT: 11.8 % (ref 10–15)
GFR SERPL CREATININE-BSD FRML MDRD: >90 ML/MIN/{1.73_M2}
GLUCOSE SERPL-MCNC: 98 MG/DL (ref 70–99)
HCT VFR BLD AUTO: 49.3 % (ref 40–53)
HGB BLD-MCNC: 16.1 G/DL (ref 13.3–17.7)
IMM GRANULOCYTES # BLD: 0 10E9/L (ref 0–0.4)
IMM GRANULOCYTES NFR BLD: 0.4 %
LIPASE SERPL-CCNC: 130 U/L (ref 73–393)
LYMPHOCYTES # BLD AUTO: 0.6 10E9/L (ref 0.8–5.3)
LYMPHOCYTES NFR BLD AUTO: 7.5 %
MAGNESIUM SERPL-MCNC: 2 MG/DL (ref 1.6–2.3)
MCH RBC QN AUTO: 30.4 PG (ref 26.5–33)
MCHC RBC AUTO-ENTMCNC: 32.7 G/DL (ref 31.5–36.5)
MCV RBC AUTO: 93 FL (ref 78–100)
MONOCYTES # BLD AUTO: 0.4 10E9/L (ref 0–1.3)
MONOCYTES NFR BLD AUTO: 5.3 %
NEUTROPHILS # BLD AUTO: 7.1 10E9/L (ref 1.6–8.3)
NEUTROPHILS NFR BLD AUTO: 86.5 %
NRBC # BLD AUTO: 0 10*3/UL
NRBC BLD AUTO-RTO: 0 /100
PLATELET # BLD AUTO: 189 10E9/L (ref 150–450)
POTASSIUM SERPL-SCNC: 3.2 MMOL/L (ref 3.4–5.3)
PROT SERPL-MCNC: 7.6 G/DL (ref 6.8–8.8)
RBC # BLD AUTO: 5.3 10E12/L (ref 4.4–5.9)
SODIUM SERPL-SCNC: 136 MMOL/L (ref 133–144)
TROPONIN I SERPL-MCNC: <0.015 UG/L (ref 0–0.04)
WBC # BLD AUTO: 8.2 10E9/L (ref 4–11)

## 2019-06-11 PROCEDURE — 25000128 H RX IP 250 OP 636: Performed by: EMERGENCY MEDICINE

## 2019-06-11 PROCEDURE — 85025 COMPLETE CBC W/AUTO DIFF WBC: CPT | Performed by: EMERGENCY MEDICINE

## 2019-06-11 PROCEDURE — 84484 ASSAY OF TROPONIN QUANT: CPT | Performed by: EMERGENCY MEDICINE

## 2019-06-11 PROCEDURE — 83735 ASSAY OF MAGNESIUM: CPT | Performed by: EMERGENCY MEDICINE

## 2019-06-11 PROCEDURE — 80053 COMPREHEN METABOLIC PANEL: CPT | Performed by: EMERGENCY MEDICINE

## 2019-06-11 PROCEDURE — 83690 ASSAY OF LIPASE: CPT | Performed by: EMERGENCY MEDICINE

## 2019-06-11 PROCEDURE — 93010 ELECTROCARDIOGRAM REPORT: CPT | Mod: Z6 | Performed by: EMERGENCY MEDICINE

## 2019-06-11 PROCEDURE — 96365 THER/PROPH/DIAG IV INF INIT: CPT | Performed by: EMERGENCY MEDICINE

## 2019-06-11 PROCEDURE — 96375 TX/PRO/DX INJ NEW DRUG ADDON: CPT | Performed by: EMERGENCY MEDICINE

## 2019-06-11 PROCEDURE — 99284 EMERGENCY DEPT VISIT MOD MDM: CPT | Mod: 25 | Performed by: EMERGENCY MEDICINE

## 2019-06-11 PROCEDURE — 93005 ELECTROCARDIOGRAM TRACING: CPT | Performed by: EMERGENCY MEDICINE

## 2019-06-11 RX ORDER — ONDANSETRON 4 MG/1
4 TABLET, ORALLY DISINTEGRATING ORAL EVERY 6 HOURS PRN
Qty: 10 TABLET | Refills: 0 | Status: SHIPPED | OUTPATIENT
Start: 2019-06-11 | End: 2019-06-14

## 2019-06-11 RX ORDER — ONDANSETRON 2 MG/ML
4 INJECTION INTRAMUSCULAR; INTRAVENOUS ONCE
Status: DISCONTINUED | OUTPATIENT
Start: 2019-06-11 | End: 2019-06-12 | Stop reason: HOSPADM

## 2019-06-11 RX ORDER — POTASSIUM CL/LIDO/0.9 % NACL 10MEQ/0.1L
10 INTRAVENOUS SOLUTION, PIGGYBACK (ML) INTRAVENOUS ONCE
Status: COMPLETED | OUTPATIENT
Start: 2019-06-11 | End: 2019-06-11

## 2019-06-11 RX ADMIN — SODIUM CHLORIDE 1000 ML: 9 INJECTION, SOLUTION INTRAVENOUS at 21:59

## 2019-06-11 RX ADMIN — Medication 10 MEQ: at 21:59

## 2019-06-11 ASSESSMENT — ENCOUNTER SYMPTOMS
COLOR CHANGE: 0
DIARRHEA: 1
CONFUSION: 0
FEVER: 0
NAUSEA: 1
EYE REDNESS: 0
DIFFICULTY URINATING: 0
ABDOMINAL PAIN: 0
VOMITING: 0
ARTHRALGIAS: 0
NECK STIFFNESS: 1
HEADACHES: 0
SHORTNESS OF BREATH: 0

## 2019-06-11 NOTE — ED AVS SNAPSHOT
[facility] [place], Emergency Department  [address] [place] [id number]  Phone:  [phone]                                    Andrea Agarwal   MRN: [id number]    Department:  [facility], Emergency Department   Date of Visit:  6/11/2019           After Visit Summary Signature Page    I have received my discharge instructions, and my questions have been answered. I have discussed any challenges I see with this plan with the nurse or doctor.    ..........................................................................................................................................  Patient/Patient Representative Signature      ..........................................................................................................................................  Patient Representative Print Name and Relationship to Patient    ..................................................               ................................................  Date                                   Time    ..........................................................................................................................................  Reviewed by Signature/Title    ...................................................              ..............................................  Date                                               Time          22EPIC Rev 08/18

## 2019-06-12 LAB — INTERPRETATION ECG - MUSE: NORMAL

## 2019-06-12 NOTE — ED TRIAGE NOTES
Andrea presents to the ED today after a syncopal episode while using the toilet, he reports he has had similar episodes in the past however today he was feeling unwell with diarrhea and had generalized muscle cramping. EMS evaluated him at home and advised him to go to the ER

## 2019-06-12 NOTE — ED PROVIDER NOTES
History     Chief Complaint   Patient presents with     Loss of Consciousness     HPI  Andrea Agarwal is a 28 year old male who presents to the Emergency Department today for evaluation following a syncopal event. The patient reports that he was sleeping this evening when he was woken in a hot sweat and feeling like he was going to have diarrhea. The patient reports that he then went to the bathroom where he had diarrhea. He states that he then started to feel nauseated and went to stand up and lost consciousness. He states that he believes he struck his face on the side of the bathtub as he had a bloody nose when he regained consciousness. The patient did not have any vomiting. Following the syncopal event, the patient was able to make his way back to bed. While in bed, he states that he began having cramping in his hands. Because of this he tried to get out of bed to get some tea as he thought he was having low blood sugar. When he tried to get out of bed he reports that he had some cramping in his legs and was not able to get out of bed. He was able to then call for his friend who then called 911. Here, the patient notes that he has had similar syncopal episodes in the past, however, he notes that in the past his syncopal events have been related to straining too hard secondary to a history of constipation. The patient otherwise reports that he had been feeling chills all day. He does note having some slight neck stiffness following the fall. He reports that he did eat today and did drink fluids. The patient denies having any chest pain, shortness of breath or headaches. The patient denies any recent antibiotic use. He is not on any daily medications. No history of abdominal surgery.     I have reviewed the Medications, Allergies, Past Medical and Surgical History, and Social History in the [facility] system.    Past Medical History:   Diagnosis Date     Recurrent otitis media      History reviewed. No  "pertinent surgical history.    History reviewed. No pertinent family history.    Social History     Tobacco Use     Smoking status: Never Smoker     Smokeless tobacco: Never Used   Substance Use Topics     Alcohol use: No     Alcohol/week: 0.0 oz     Current Facility-Administered Medications   Medication     ondansetron (ZOFRAN) injection 4 mg     Current Outpatient Medications   Medication     ondansetron (ZOFRAN ODT) 4 MG ODT tab     Allergies   Allergen Reactions     No Clinical Screening - See Comments      Patient suspects he has allergies     Review of Systems   Constitutional: Negative for fever.   HENT: Negative for congestion.    Eyes: Negative for redness.   Respiratory: Negative for shortness of breath.    Cardiovascular: Negative for chest pain.   Gastrointestinal: Positive for diarrhea and nausea. Negative for abdominal pain and vomiting.   Genitourinary: Negative for difficulty urinating.   Musculoskeletal: Positive for neck stiffness. Negative for arthralgias.   Skin: Negative for color change.   Neurological: Positive for syncope. Negative for headaches.   Psychiatric/Behavioral: Negative for confusion.   All other systems reviewed and are negative.    Physical Exam   BP: 112/63  Pulse: 99  Heart Rate: 108  Temp: 99.3  F (37.4  C)  Resp: 16  Height: 170 cm (5' 6.93\")  SpO2: 100 %    Physical Exam   Constitutional: He is oriented to person, place, and time. He appears well-developed and well-nourished. No distress.   HENT:   Head: Normocephalic and atraumatic.   Mouth/Throat: Oropharynx is clear and moist.   Eyes: Pupils are equal, round, and reactive to light. No scleral icterus.   Neck: Muscular tenderness present. No spinous process tenderness present. No neck rigidity. Normal range of motion present.   Cardiovascular: Normal rate, regular rhythm, normal heart sounds and intact distal pulses.   Pulmonary/Chest: Effort normal and breath sounds normal. No respiratory distress. He exhibits no " tenderness.   Abdominal: Soft. Bowel sounds are normal. There is no tenderness.   Musculoskeletal: Normal range of motion. He exhibits no edema or tenderness.        Thoracic back: He exhibits no tenderness.        Lumbar back: He exhibits no tenderness.   Neurological: He is alert and oriented to person, place, and time. He has normal strength. No cranial nerve deficit. Coordination and gait normal.   Skin: Skin is warm. No rash noted. He is not diaphoretic.   Nursing note and vitals reviewed.      ED Course   9:15 PM  The patient was seen and examined by Dr. Frey in Room 04.       Procedures             EKG Interpretation:      Interpreted by BUSHRA FREY MD  Time reviewed:   Symptoms at time of EK   Rhythm: normal sinus   Rate: 94  Axis: normal  Ectopy: none  Conduction: normal  ST Segments/ T Waves: No ST-T wave changes  Q Waves: none  Comparison to prior: No old EKG available    Clinical Impression: normal EKG          Critical Care time:  none             Results for orders placed or performed during the hospital encounter of 19 (from the past 24 hour(s))   CBC with platelets differential   Result Value Ref Range    WBC 8.2 4.0 - 11.0 10e9/L    RBC Count 5.30 4.4 - 5.9 10e12/L    Hemoglobin 16.1 13.3 - 17.7 g/dL    Hematocrit 49.3 40.0 - 53.0 %    MCV 93 78 - 100 fl    MCH 30.4 26.5 - 33.0 pg    MCHC 32.7 31.5 - 36.5 g/dL    RDW 11.8 10.0 - 15.0 %    Platelet Count 189 150 - 450 10e9/L    Diff Method Automated Method     % Neutrophils 86.5 %    % Lymphocytes 7.5 %    % Monocytes 5.3 %    % Eosinophils 0.1 %    % Basophils 0.2 %    % Immature Granulocytes 0.4 %    Nucleated RBCs 0 0 /100    Absolute Neutrophil 7.1 1.6 - 8.3 10e9/L    Absolute Lymphocytes 0.6 (L) 0.8 - 5.3 10e9/L    Absolute Monocytes 0.4 0.0 - 1.3 10e9/L    Absolute Eosinophils 0.0 0.0 - 0.7 10e9/L    Absolute Basophils 0.0 0.0 - 0.2 10e9/L    Abs Immature Granulocytes 0.0 0 - 0.4 10e9/L    Absolute Nucleated RBC 0.0     Comprehensive metabolic panel   Result Value Ref Range    Sodium 136 133 - 144 mmol/L    Potassium 3.2 (L) 3.4 - 5.3 mmol/L    Chloride 102 94 - 109 mmol/L    Carbon Dioxide 26 20 - 32 mmol/L    Anion Gap 8 3 - 14 mmol/L    Glucose 98 70 - 99 mg/dL    Urea Nitrogen 9 7 - 30 mg/dL    Creatinine 0.96 0.66 - 1.25 mg/dL    GFR Estimate >90 >60 mL/min/[1.73_m2]    GFR Estimate If Black >90 >60 mL/min/[1.73_m2]    Calcium 9.0 8.5 - 10.1 mg/dL    Bilirubin Total 0.9 0.2 - 1.3 mg/dL    Albumin 4.5 3.4 - 5.0 g/dL    Protein Total 7.6 6.8 - 8.8 g/dL    Alkaline Phosphatase 52 40 - 150 U/L    ALT 31 0 - 70 U/L    AST 11 0 - 45 U/L   Troponin I   Result Value Ref Range    Troponin I ES <0.015 0.000 - 0.045 ug/L   Lipase   Result Value Ref Range    Lipase 130 73 - 393 U/L   Magnesium   Result Value Ref Range    Magnesium 2.0 1.6 - 2.3 mg/dL   EKG 12 lead   Result Value Ref Range    Interpretation ECG Click View Image link to view waveform and result           Medications   ondansetron (ZOFRAN) injection 4 mg (4 mg Intravenous Not Given 6/11/19 2315)   0.9% sodium chloride BOLUS (0 mLs Intravenous Stopped 6/11/19 2305)   potassium chloride 10 mEq in 100 mL intermittent infusion with 10 mg lidocaine (0 mEq Intravenous Stopped 6/11/19 2305)      11:00 PM Feeling better.  Abdomen soft and non-tender.    Medications   ondansetron (ZOFRAN) injection 4 mg (4 mg Intravenous Not Given 6/11/19 2315)   0.9% sodium chloride BOLUS (0 mLs Intravenous Stopped 6/11/19 2305)   potassium chloride 10 mEq in 100 mL intermittent infusion with 10 mg lidocaine (0 mEq Intravenous Stopped 6/11/19 2305)        Assessments & Plan (with Medical Decision Making)   28 year old male to the emergency department for evaluation of syncopal episode associated with nausea and diarrhea.  The patient has a normal EKG.  His physical examination is unrevealing.  He does not have any abdominal tenderness on exam.  His labs are remarkable only for mild hypokalemia.   He received IV potassium replacement in the emergency department.  He felt clinically improved after the above therapies.  He will be discharged home.  Ondansetron prescribed for nausea.  Patient was cautioned that he needs to notify the DMV regarding his syncopal episode and be cleared by his outpatient provider before he resumes driving.    I have reviewed the nursing notes.    I have reviewed the findings, diagnosis, plan and need for follow up with the patient.     Medication List      Started    ondansetron 4 MG ODT tab  Commonly known as:  ZOFRAN ODT  4 mg, Oral, EVERY 6 HOURS PRN          Final diagnoses:   Vomiting and diarrhea   Syncope, unspecified syncope type   Hypokalemia   I, Siddharth Shah, am serving as a trained medical scribe to document services personally performed by Sunny Li MD, based on the provider's statements to me.   ISunny MD, was physically present and have reviewed and verified the accuracy of this note documented by Siddharth Shah.     6/11/2019   [facility], [place], EMERGENCY DEPARTMENT     Sunny Li MD  06/11/19 [id number]

## 2019-06-12 NOTE — DISCHARGE INSTRUCTIONS
Drink plenty of fluids.  Eat foods that are rich in potassium.  Take ondansetron as needed for nausea.    You must notify the DMV of your fainting episode.   Follow-up with your primary care provider to clear you before you drive again.    Return to the emergency department if abdominal pain, worsening symptoms, or other concerns.

## (undated) RX ORDER — ALBUTEROL SULFATE 0.83 MG/ML
SOLUTION RESPIRATORY (INHALATION)
Status: DISPENSED
Start: 2017-01-04